# Patient Record
Sex: FEMALE | Race: WHITE | NOT HISPANIC OR LATINO | Employment: STUDENT | URBAN - METROPOLITAN AREA
[De-identification: names, ages, dates, MRNs, and addresses within clinical notes are randomized per-mention and may not be internally consistent; named-entity substitution may affect disease eponyms.]

---

## 2019-03-04 ENCOUNTER — APPOINTMENT (EMERGENCY)
Dept: RADIOLOGY | Facility: HOSPITAL | Age: 19
End: 2019-03-04
Payer: COMMERCIAL

## 2019-03-04 ENCOUNTER — HOSPITAL ENCOUNTER (EMERGENCY)
Facility: HOSPITAL | Age: 19
Discharge: HOME | End: 2019-03-04
Attending: EMERGENCY MEDICINE | Admitting: EMERGENCY MEDICINE
Payer: COMMERCIAL

## 2019-03-04 VITALS
SYSTOLIC BLOOD PRESSURE: 137 MMHG | DIASTOLIC BLOOD PRESSURE: 96 MMHG | RESPIRATION RATE: 24 BRPM | OXYGEN SATURATION: 100 % | TEMPERATURE: 98 F | WEIGHT: 138.67 LBS | BODY MASS INDEX: 22.29 KG/M2 | HEIGHT: 66 IN | HEART RATE: 101 BPM

## 2019-03-04 DIAGNOSIS — S82.409A FIBULA FRACTURE: Primary | ICD-10-CM

## 2019-03-04 PROCEDURE — 73610 X-RAY EXAM OF ANKLE: CPT

## 2019-03-04 PROCEDURE — 99283 EMERGENCY DEPT VISIT LOW MDM: CPT

## 2019-03-04 RX ORDER — FLUOXETINE HYDROCHLORIDE 20 MG/1
20 CAPSULE ORAL DAILY
COMMUNITY

## 2019-03-04 RX ORDER — HYDROCODONE BITARTRATE AND ACETAMINOPHEN 5; 325 MG/1; MG/1
1 TABLET ORAL EVERY 6 HOURS PRN
Qty: 12 TABLET | Refills: 0 | Status: SHIPPED | OUTPATIENT
Start: 2019-03-04

## 2019-03-04 RX ORDER — HYDROCODONE BITARTRATE AND ACETAMINOPHEN 5; 325 MG/1; MG/1
1 TABLET ORAL EVERY 6 HOURS PRN
Qty: 12 TABLET | Refills: 0
Start: 2019-03-04 | End: 2019-03-04 | Stop reason: SDUPTHER

## 2019-03-04 RX ORDER — IBUPROFEN 600 MG/1
600 TABLET ORAL ONCE
Status: COMPLETED | OUTPATIENT
Start: 2019-03-04 | End: 2019-03-04

## 2019-03-04 RX ADMIN — IBUPROFEN 600 MG: 600 TABLET ORAL at 19:55

## 2019-03-05 NOTE — ED PROVIDER NOTES
History  Chief Complaint   Patient presents with    Ankle Injury     ptfell on ice just prior to arrival       History provided by:  Patient  Ankle Injury   Location:  Right ankle  Severity:  Moderate  Onset quality:  Sudden  Timing:  Constant  Progression:  Waxing and waning  Chronicity:  New  Context:  Inversion injury  Relieved by:  Rest  Worsened by:  Ambulation and palpation  Associated symptoms: no fatigue, no fever and no rash        Prior to Admission Medications   Prescriptions Last Dose Informant Patient Reported? Taking? FLUoxetine (PROzac) 20 mg capsule   Yes Yes   Sig: Take 20 mg by mouth daily      Facility-Administered Medications: None       History reviewed  No pertinent past medical history  History reviewed  No pertinent surgical history  History reviewed  No pertinent family history  I have reviewed and agree with the history as documented  Social History     Tobacco Use    Smoking status: Never Smoker    Smokeless tobacco: Never Used   Substance Use Topics    Alcohol use: Never     Frequency: Never    Drug use: Never        Review of Systems   Constitutional: Positive for activity change  Negative for appetite change, chills, fatigue and fever  Musculoskeletal: Positive for arthralgias, gait problem and joint swelling  Skin: Negative for rash  Psychiatric/Behavioral: Negative for confusion  All other systems reviewed and are negative  Physical Exam  Physical Exam   Constitutional: She is oriented to person, place, and time  She appears well-developed and well-nourished  No distress  HENT:   Head: Normocephalic and atraumatic  Right Ear: External ear normal    Left Ear: External ear normal    Nose: Nose normal    Eyes: Conjunctivae are normal  Right eye exhibits no discharge  Left eye exhibits no discharge  Pulmonary/Chest: Effort normal    Musculoskeletal:   Examination of the right ankle-there is a marked amount of swelling present over the lateral aspect  There is tenderness palpated over the distal fibula and the lateral collateral ligaments  The foot is nontender the lower leg and knee are nontender with good range of motion  There are palpable pulses over the posterior tibial and dorsalis pedis arteries that are +2  Capillary refills less than 2 seconds   Neurological: She is alert and oriented to person, place, and time  Skin: Skin is warm  Capillary refill takes less than 2 seconds  She is not diaphoretic  Psychiatric: She has a normal mood and affect  Her behavior is normal  Judgment and thought content normal    Nursing note and vitals reviewed  Vital Signs  ED Triage Vitals   Temperature Pulse Respirations Blood Pressure SpO2   03/04/19 1943 03/04/19 1941 03/04/19 1941 03/04/19 1941 03/04/19 1941   98 °F (36 7 °C) 101 (!) 24 137/96 100 %      Temp Source Heart Rate Source Patient Position - Orthostatic VS BP Location FiO2 (%)   03/04/19 1943 03/04/19 1941 03/04/19 1941 03/04/19 1941 --   Oral Monitor Lying Right arm       Pain Score       03/04/19 1941       7           Vitals:    03/04/19 1941   BP: 137/96   Pulse: 101   Patient Position - Orthostatic VS: Lying       Visual Acuity      ED Medications  Medications   ibuprofen (MOTRIN) tablet 600 mg (600 mg Oral Given 3/4/19 1955)       Diagnostic Studies  Results Reviewed     None                 XR ankle 3+ views RIGHT   ED Interpretation by Polina Araiza PA-C (03/04 1953)   Nondisplaced distal fibular fracture      Final Result by Miles Ascencio MD (03/04 2046)      Acute nondisplaced oblique fracture of the distal right fibula  Findings concur with ED results as provided in PACS viewer/EPIC at the time of interpretation        Workstation performed: [id number]                    Procedures  Procedures       Phone Contacts  ED Phone Contact    ED Course                               MDM  Number of Diagnoses or Management Options  Fibula fracture: new and requires workup Amount and/or Complexity of Data Reviewed  Tests in the radiology section of CPT®: ordered and reviewed  Tests in the medicine section of CPT®: ordered and reviewed    Risk of Complications, Morbidity, and/or Mortality  Presenting problems: moderate  Diagnostic procedures: moderate  Management options: moderate  General comments: Patient presents emergency room after slipping on the ice and having an inversion injury to her right ankle  She was seen and examined  X-rays demonstrated a nondisplaced fracture of the distal fibula  Patient was placed in a posterior splint  She was given crutches to be nonweightbearing on the right lower extremity  She was given a prescription for Vicodin  She was given an orthopedic referral for follow-up  Patient Progress  Patient progress: stable      Disposition  Final diagnoses:   Fibula fracture - Acute nondisplaced distal fibular fracture     Time reflects when diagnosis was documented in both MDM as applicable and the Disposition within this note     Time User Action Codes Description Comment    3/4/2019  7:54 PM Gabriela Foley Add [F65 282I] Fibula fracture     3/4/2019  7:54 PM Gabriela Foley Modify [S82 409A] Fibula fracture Acute nondisplaced distal fibular fracture      ED Disposition     ED Disposition Condition Date/Time Comment    Discharge Stable Mon Mar 4, 2019  7:54 PM Zayda Joy discharge to home/self care              Follow-up Information     Follow up With Specialties Details Why [address], DO Orthopedic Surgery Schedule an appointment as soon as possible for a visit in 1 day  [address] [place] [address]  [address] [facility]            Discharge Medication List as of 3/4/2019  7:59 PM      CONTINUE these medications which have CHANGED    Details   HYDROcodone-acetaminophen (NORCO) 5-325 mg per tablet Take 1 tablet by mouth every 6 (six) hours as needed for pain for up to 12 dosesMax Daily Amount: 4 tablets, Starting Mon 3/4/2019, Print         CONTINUE these medications which have NOT CHANGED    Details   FLUoxetine (PROzac) 20 mg capsule Take 20 mg by mouth daily, Historical Med           Outpatient Discharge Orders   Splint     Crutches       ED Provider  Electronically Signed by           Maurice Gutierrez PA-C  03/04/19 9988

## 2019-03-08 ENCOUNTER — OFFICE VISIT (OUTPATIENT)
Dept: OBGYN CLINIC | Facility: MEDICAL CENTER | Age: 19
End: 2019-03-08
Payer: COMMERCIAL

## 2019-03-08 VITALS
HEIGHT: 66 IN | SYSTOLIC BLOOD PRESSURE: 114 MMHG | BODY MASS INDEX: 22.38 KG/M2 | DIASTOLIC BLOOD PRESSURE: 75 MMHG | HEART RATE: 70 BPM

## 2019-03-08 DIAGNOSIS — S82.831A NONDISPLACED FRACTURE OF DISTAL END OF RIGHT FIBULA: Primary | ICD-10-CM

## 2019-03-08 PROCEDURE — 29405 APPL SHORT LEG CAST: CPT | Performed by: ORTHOPAEDIC SURGERY

## 2019-03-08 PROCEDURE — 99204 OFFICE O/P NEW MOD 45 MIN: CPT | Performed by: ORTHOPAEDIC SURGERY

## 2019-03-08 NOTE — PROGRESS NOTES
Assessment:  1  Nondisplaced fracture of distal end of right fibula  Cast application     There is no problem list on file for this patient  Plan      Patient was  placed in a short leg cast nonweightbearing today   Patient is nonweightbearing and to ambulate with  crutches  Advised patient to take Aleve twice a day p r n  for pain  Advised patient to ice and elevate as much as possible   Will obtain  new x-rays of right ankle at follow up   Will have cast removed and have patient transition to Cam boot at follow up   Follow-up in 2 weeks            Subjective:     Patient ID:    Chief [address] [place] 19 y o  female      HPI    Patient comes in today with regards to right ankle   Patient states she slipped on black ice four days ago and her right leg went inward and felt a pop  Patient went to the ER and was placed in a splint and was provided crutches  Patient is non weight bearing  The patient reports that the pain is in the right lateral ankle  and has been going on for  4 days   The pain is rated at4 at its best and8 at its worst   The pain is described as constant trobbing achy pain   It is worsened with any pressure on the right foot, and is made better with rest and elevating  The patient has taken IBU  for treatment  Patient has history of right ankle sprain         The following portions of the patient's history were reviewed and updated as appropriate: allergies, current medications, past family history, past social history, past surgical history and problem list         Social History     Socioeconomic History    Marital status: Single     Spouse name: Not on file    Number of children: Not on file    Years of education: Not on file    Highest education level: Not on file   Occupational History    Not on file   Social Needs    Financial resource strain: Not on file    Food insecurity:     Worry: Not on file     Inability: Not on file    Transportation needs:     Medical: Not on file     Non-medical: Not on file   Tobacco Use    Smoking status: Never Smoker    Smokeless tobacco: Never Used   Substance and Sexual Activity    Alcohol use: Never     Frequency: Never    Drug use: Never    Sexual activity: Not on file   Lifestyle    Physical activity:     Days per week: Not on file     Minutes per session: Not on file    Stress: Not on file   Relationships    Social connections:     Talks on phone: Not on file     Gets together: Not on file     Attends [employer] service: Not on file     Active member of club or organization: Not on file     Attends meetings of clubs or organizations: Not on file     Relationship status: Not on file    Intimate partner violence:     Fear of current or ex partner: Not on file     Emotionally abused: Not on file     Physically abused: Not on file     Forced sexual activity: Not on file   Other Topics Concern    Not on file   Social History Narrative    Not on file     No past medical history on file  No past surgical history on file  No Known Allergies  Current Outpatient Medications on File Prior to Visit   Medication Sig Dispense Refill    FLUoxetine (PROzac) 20 mg capsule Take 20 mg by mouth daily      HYDROcodone-acetaminophen (NORCO) 5-325 mg per tablet Take 1 tablet by mouth every 6 (six) hours as needed for pain for up to 12 dosesMax Daily Amount: 4 tablets 12 tablet 0     No current facility-administered medications on file prior to visit  Objective:    Review of Systems   Constitutional: Negative for chills, fever and unexpected weight change  HENT: Negative for hearing loss, nosebleeds and postnasal drip  Eyes: Negative for pain, redness and visual disturbance  Respiratory: Negative for cough, shortness of breath and wheezing  Cardiovascular: Negative for chest pain, palpitations and leg swelling  Gastrointestinal: Negative for abdominal pain, nausea and vomiting  Endocrine: Negative for polydipsia and polyuria  Genitourinary: Negative for dysuria  Musculoskeletal: Positive for joint swelling  Skin: Negative for rash and wound  Neurological: Negative for dizziness, numbness and headaches  Psychiatric/Behavioral: Negative for decreased concentration and suicidal ideas  The patient is not nervous/anxious  Right Ankle Exam     Other   Erythema: absent  Scars: absent  Sensation: normal  Pulse: present     Comments:  Ecchymosis around the  fibula  Moderate swelling  Ecchymosis lateral  fibula and 5th metatarsal  Sensation intact  Able to get to neutral position  45° plantar  5° eversion  10° inversion                  Physical Exam   Constitutional: She is oriented to person, place, and time  She appears well-developed and well-nourished  Eyes: Pupils are equal, round, and reactive to light  Cardiovascular: Normal rate and regular rhythm  Pulmonary/Chest: Effort normal and breath sounds normal    Neurological: She is alert and oriented to person, place, and time  Skin: Skin is warm and dry  Psychiatric: She has a normal mood and affect  Her behavior is normal  Judgment and thought content normal        Cast application  Date/Time: 3/8/2019 2:52 PM  Performed by: Loan Barrera DO  Authorized by: Loan Barrera DO     Consent:     Consent obtained:  Verbal    Consent given by:  Patient  Procedure details:     Laterality:  Right    Location:  AnkleCast type:  Short leg    Supplies:  Cotton padding, fiberglass and skin protective strip           I have personally reviewed pertinent films in PACS and my interpretation is Xr Right ankle : nondisplaced fracture of the distal right fibular  Portions of the record may have been created with voice recognition software   Occasional wrong word or "sound a like" substitutions may have occurred due to the inherent limitations of voice recognition software   Read the chart carefully and recognize, using context, where substitutions have occurred

## 2019-03-20 ENCOUNTER — OFFICE VISIT (OUTPATIENT)
Dept: OBGYN CLINIC | Facility: CLINIC | Age: 19
End: 2019-03-20
Payer: COMMERCIAL

## 2019-03-20 ENCOUNTER — APPOINTMENT (OUTPATIENT)
Dept: RADIOLOGY | Facility: CLINIC | Age: 19
End: 2019-03-20
Payer: COMMERCIAL

## 2019-03-20 DIAGNOSIS — S82.831A NONDISPLACED FRACTURE OF DISTAL END OF RIGHT FIBULA: ICD-10-CM

## 2019-03-20 DIAGNOSIS — S82.831A NONDISPLACED FRACTURE OF DISTAL END OF RIGHT FIBULA: Primary | ICD-10-CM

## 2019-03-20 PROCEDURE — 99213 OFFICE O/P EST LOW 20 MIN: CPT | Performed by: ORTHOPAEDIC SURGERY

## 2019-03-20 PROCEDURE — 73610 X-RAY EXAM OF ANKLE: CPT

## 2019-03-20 NOTE — PROGRESS NOTES
Assessment:    status post nondisplaced oblique right distal fibular fracture on 03/04/2019    Plan:    patient transition from cast to a Cam walker boot today  Nonweightbearing in a Cam walker boot for 4 weeks, she may come out of it for hygiene  Oral analgesics as needed for pain   follow-up in 4 weeks, explained to the patient that at that time as long as x-rays appropriate will get her to the weight-bearing in the boot and after short period of weight-bearing with the boot should be able to get her out of that entirely and based on her degree of stiffness and range of motion may refer her to physical therapy        To Do Next Visit:   x-ray of the right ankle, please externally rotate the ankle  This time on the lateral to better visualize the fibula    CHIEF COMPLAINT:  Chief Complaint   Patient presents with    Right Ankle - Follow-up         SUBJECTIVE:  Maryetta Meigs is a 19y o  year old female who presents for follow up after  nondisplaced  Oblique fracture of the right distal fibula  Today patient has Pain  Mild  Intermittant  Dull and Aching  Around the right lateral ankle  She reports she has had no issues with the cast   She has been compliant with nonweightbearing status  No increases in pain  No numbness tingling fevers or chills        PHYSICAL EXAMINATION:  General: well developed and well nourished, alert, oriented times 3 and appears comfortable  Psychiatric: Normal    MUSCULOSKELETAL EXAMINATION:    Skin:  intact, there is mild swelling about the lateral ankle, there is no ecchymosis or erythema,  tenderness palpation over distal fibula but no palpable deformity  Range of Motion: As expected, Limited due to pain and Limited due to stiffness  Neurovascular status: Neuro intact, good cap refill        STUDIES REVIEWED:  Images were reviewd in PACS:  X-ray of right ankle demonstrates healing of distal oblique fibula fracture, there is no displacement in the interval since 2 weeks ago, there is some callus formation however fracture line still evident clearly on the lateral view      PROCEDURES PERFORMED:  Procedures  No Procedures performed today

## 2019-03-25 ENCOUNTER — TELEPHONE (OUTPATIENT)
Dept: OBGYN CLINIC | Facility: CLINIC | Age: 19
End: 2019-03-25

## 2019-03-25 NOTE — TELEPHONE ENCOUNTER
Dr Moris aGllardo  Patients mother called concerned about her daughters ankle  She is asking if you would please take on her care as she would like [facility] to follow up with a ankle specialist  Mom had conflicting answers from 2 doctors  Will you take on this patient?  If so daughter will call to schedule appt due to her class schedule

## 2019-04-02 ENCOUNTER — OFFICE VISIT (OUTPATIENT)
Dept: OBGYN CLINIC | Facility: CLINIC | Age: 19
End: 2019-04-02
Payer: COMMERCIAL

## 2019-04-02 ENCOUNTER — APPOINTMENT (OUTPATIENT)
Dept: RADIOLOGY | Facility: CLINIC | Age: 19
End: 2019-04-02
Payer: COMMERCIAL

## 2019-04-02 VITALS
SYSTOLIC BLOOD PRESSURE: 114 MMHG | BODY MASS INDEX: 22.38 KG/M2 | HEART RATE: 73 BPM | HEIGHT: 66 IN | DIASTOLIC BLOOD PRESSURE: 72 MMHG

## 2019-04-02 DIAGNOSIS — S82.831A NONDISPLACED FRACTURE OF DISTAL END OF RIGHT FIBULA: Primary | ICD-10-CM

## 2019-04-02 DIAGNOSIS — S82.831A NONDISPLACED FRACTURE OF DISTAL END OF RIGHT FIBULA: ICD-10-CM

## 2019-04-02 PROCEDURE — 99213 OFFICE O/P EST LOW 20 MIN: CPT | Performed by: ORTHOPAEDIC SURGERY

## 2019-04-02 PROCEDURE — 73610 X-RAY EXAM OF ANKLE: CPT

## 2019-04-02 RX ORDER — NORETHINDRONE, ETHINYL ESTRADIOL, AND FERROUS FUMARATE 0.8-25(24)
KIT ORAL
COMMUNITY
Start: 2019-01-17

## 2019-04-04 ENCOUNTER — EVALUATION (OUTPATIENT)
Dept: PHYSICAL THERAPY | Facility: CLINIC | Age: 19
End: 2019-04-04
Payer: COMMERCIAL

## 2019-04-04 DIAGNOSIS — S82.831A NONDISPLACED FRACTURE OF DISTAL END OF RIGHT FIBULA: Primary | ICD-10-CM

## 2019-04-04 PROCEDURE — G8979 MOBILITY GOAL STATUS: HCPCS | Performed by: PHYSICAL THERAPIST

## 2019-04-04 PROCEDURE — G8978 MOBILITY CURRENT STATUS: HCPCS | Performed by: PHYSICAL THERAPIST

## 2019-04-04 PROCEDURE — 97161 PT EVAL LOW COMPLEX 20 MIN: CPT | Performed by: PHYSICAL THERAPIST

## 2019-04-04 PROCEDURE — 97010 HOT OR COLD PACKS THERAPY: CPT | Performed by: PHYSICAL THERAPIST

## 2019-04-09 ENCOUNTER — OFFICE VISIT (OUTPATIENT)
Dept: PHYSICAL THERAPY | Facility: CLINIC | Age: 19
End: 2019-04-09
Payer: COMMERCIAL

## 2019-04-09 DIAGNOSIS — S82.831A NONDISPLACED FRACTURE OF DISTAL END OF RIGHT FIBULA: Primary | ICD-10-CM

## 2019-04-09 PROCEDURE — 97110 THERAPEUTIC EXERCISES: CPT | Performed by: PHYSICAL THERAPIST

## 2019-04-09 PROCEDURE — 97140 MANUAL THERAPY 1/> REGIONS: CPT | Performed by: PHYSICAL THERAPIST

## 2019-04-16 ENCOUNTER — OFFICE VISIT (OUTPATIENT)
Dept: PHYSICAL THERAPY | Facility: CLINIC | Age: 19
End: 2019-04-16
Payer: COMMERCIAL

## 2019-04-16 DIAGNOSIS — S82.831A NONDISPLACED FRACTURE OF DISTAL END OF RIGHT FIBULA: Primary | ICD-10-CM

## 2019-04-16 PROCEDURE — 97112 NEUROMUSCULAR REEDUCATION: CPT | Performed by: PHYSICAL THERAPIST

## 2019-04-16 PROCEDURE — 97140 MANUAL THERAPY 1/> REGIONS: CPT | Performed by: PHYSICAL THERAPIST

## 2019-04-16 PROCEDURE — 97110 THERAPEUTIC EXERCISES: CPT | Performed by: PHYSICAL THERAPIST

## 2019-04-19 ENCOUNTER — OFFICE VISIT (OUTPATIENT)
Dept: OBGYN CLINIC | Facility: CLINIC | Age: 19
End: 2019-04-19
Payer: COMMERCIAL

## 2019-04-19 ENCOUNTER — APPOINTMENT (OUTPATIENT)
Dept: RADIOLOGY | Facility: CLINIC | Age: 19
End: 2019-04-19
Payer: COMMERCIAL

## 2019-04-19 VITALS
BODY MASS INDEX: 22.38 KG/M2 | DIASTOLIC BLOOD PRESSURE: 69 MMHG | HEART RATE: 81 BPM | SYSTOLIC BLOOD PRESSURE: 104 MMHG | HEIGHT: 66 IN

## 2019-04-19 DIAGNOSIS — S82.831A NONDISPLACED FRACTURE OF DISTAL END OF RIGHT FIBULA: Primary | ICD-10-CM

## 2019-04-19 DIAGNOSIS — S82.831A NONDISPLACED FRACTURE OF DISTAL END OF RIGHT FIBULA: ICD-10-CM

## 2019-04-19 PROCEDURE — 99213 OFFICE O/P EST LOW 20 MIN: CPT | Performed by: ORTHOPAEDIC SURGERY

## 2019-04-19 PROCEDURE — 73610 X-RAY EXAM OF ANKLE: CPT

## 2019-04-26 ENCOUNTER — OFFICE VISIT (OUTPATIENT)
Dept: PHYSICAL THERAPY | Facility: CLINIC | Age: 19
End: 2019-04-26
Payer: COMMERCIAL

## 2019-04-26 DIAGNOSIS — S82.831A NONDISPLACED FRACTURE OF DISTAL END OF RIGHT FIBULA: Primary | ICD-10-CM

## 2019-04-26 PROCEDURE — 97112 NEUROMUSCULAR REEDUCATION: CPT | Performed by: PHYSICAL THERAPIST

## 2019-04-26 PROCEDURE — 97140 MANUAL THERAPY 1/> REGIONS: CPT | Performed by: PHYSICAL THERAPIST

## 2019-04-26 PROCEDURE — 97110 THERAPEUTIC EXERCISES: CPT | Performed by: PHYSICAL THERAPIST

## 2019-05-02 ENCOUNTER — EVALUATION (OUTPATIENT)
Dept: PHYSICAL THERAPY | Facility: CLINIC | Age: 19
End: 2019-05-02
Payer: COMMERCIAL

## 2019-05-02 DIAGNOSIS — S82.831A NONDISPLACED FRACTURE OF DISTAL END OF RIGHT FIBULA: Primary | ICD-10-CM

## 2019-05-02 PROCEDURE — 97112 NEUROMUSCULAR REEDUCATION: CPT | Performed by: PHYSICAL THERAPIST

## 2019-05-02 PROCEDURE — G8979 MOBILITY GOAL STATUS: HCPCS | Performed by: PHYSICAL THERAPIST

## 2019-05-02 PROCEDURE — 97116 GAIT TRAINING THERAPY: CPT | Performed by: PHYSICAL THERAPIST

## 2019-05-02 PROCEDURE — G8978 MOBILITY CURRENT STATUS: HCPCS | Performed by: PHYSICAL THERAPIST

## 2019-05-06 ENCOUNTER — OFFICE VISIT (OUTPATIENT)
Dept: PHYSICAL THERAPY | Facility: CLINIC | Age: 19
End: 2019-05-06
Payer: COMMERCIAL

## 2019-05-06 DIAGNOSIS — S82.831A NONDISPLACED FRACTURE OF DISTAL END OF RIGHT FIBULA: Primary | ICD-10-CM

## 2019-05-06 PROCEDURE — 97112 NEUROMUSCULAR REEDUCATION: CPT | Performed by: PHYSICAL THERAPIST

## 2019-05-06 PROCEDURE — 97116 GAIT TRAINING THERAPY: CPT | Performed by: PHYSICAL THERAPIST

## 2019-05-14 ENCOUNTER — OFFICE VISIT (OUTPATIENT)
Dept: PHYSICAL THERAPY | Facility: CLINIC | Age: 19
End: 2019-05-14
Payer: COMMERCIAL

## 2019-05-14 DIAGNOSIS — S82.831A NONDISPLACED FRACTURE OF DISTAL END OF RIGHT FIBULA: Primary | ICD-10-CM

## 2019-05-14 PROCEDURE — 97112 NEUROMUSCULAR REEDUCATION: CPT | Performed by: PHYSICAL THERAPIST

## 2019-05-14 PROCEDURE — 97140 MANUAL THERAPY 1/> REGIONS: CPT | Performed by: PHYSICAL THERAPIST

## 2019-05-14 PROCEDURE — 97116 GAIT TRAINING THERAPY: CPT | Performed by: PHYSICAL THERAPIST

## 2019-05-31 ENCOUNTER — OFFICE VISIT (OUTPATIENT)
Dept: OBGYN CLINIC | Facility: CLINIC | Age: 19
End: 2019-05-31
Payer: COMMERCIAL

## 2019-05-31 ENCOUNTER — OFFICE VISIT (OUTPATIENT)
Dept: PHYSICAL THERAPY | Facility: CLINIC | Age: 19
End: 2019-05-31
Payer: COMMERCIAL

## 2019-05-31 VITALS
HEIGHT: 66 IN | DIASTOLIC BLOOD PRESSURE: 68 MMHG | BODY MASS INDEX: 21.38 KG/M2 | WEIGHT: 133 LBS | SYSTOLIC BLOOD PRESSURE: 105 MMHG | HEART RATE: 66 BPM

## 2019-05-31 DIAGNOSIS — S82.831A NONDISPLACED FRACTURE OF DISTAL END OF RIGHT FIBULA: Primary | ICD-10-CM

## 2019-05-31 PROCEDURE — 97112 NEUROMUSCULAR REEDUCATION: CPT | Performed by: PHYSICAL MEDICINE & REHABILITATION

## 2019-05-31 PROCEDURE — 99213 OFFICE O/P EST LOW 20 MIN: CPT | Performed by: ORTHOPAEDIC SURGERY

## 2019-06-03 ENCOUNTER — APPOINTMENT (OUTPATIENT)
Dept: PHYSICAL THERAPY | Facility: CLINIC | Age: 19
End: 2019-06-03
Payer: COMMERCIAL

## 2019-06-07 ENCOUNTER — EVALUATION (OUTPATIENT)
Dept: PHYSICAL THERAPY | Facility: CLINIC | Age: 19
End: 2019-06-07
Payer: COMMERCIAL

## 2019-06-07 DIAGNOSIS — S82.831A NONDISPLACED FRACTURE OF DISTAL END OF RIGHT FIBULA: Primary | ICD-10-CM

## 2019-06-07 PROCEDURE — G8979 MOBILITY GOAL STATUS: HCPCS | Performed by: PHYSICAL THERAPIST

## 2019-06-07 PROCEDURE — G8980 MOBILITY D/C STATUS: HCPCS | Performed by: PHYSICAL THERAPIST

## 2019-06-07 PROCEDURE — 97112 NEUROMUSCULAR REEDUCATION: CPT | Performed by: PHYSICAL THERAPIST

## 2020-11-09 ENCOUNTER — NEW PATIENT (OUTPATIENT)
Dept: URBAN - METROPOLITAN AREA CLINIC 79 | Facility: CLINIC | Age: 20
End: 2020-11-09

## 2020-11-09 VITALS — HEIGHT: 55 IN

## 2020-11-09 DIAGNOSIS — R51.9: ICD-10-CM

## 2020-11-09 PROCEDURE — 99204 OFFICE O/P NEW MOD 45 MIN: CPT

## 2020-11-09 ASSESSMENT — VISUAL ACUITY
OD_SC: 20/20
OS_SC: J1+
OS_SC: 20/20
OD_SC: J1+

## 2020-11-09 ASSESSMENT — TONOMETRY
OS_IOP_MMHG: 11
OD_IOP_MMHG: 13

## 2021-12-15 ENCOUNTER — ESTABLISHED COMPREHENSIVE EXAM (OUTPATIENT)
Dept: URBAN - METROPOLITAN AREA CLINIC 79 | Facility: CLINIC | Age: 21
End: 2021-12-15

## 2021-12-15 DIAGNOSIS — R51.9: ICD-10-CM

## 2021-12-15 PROCEDURE — 99214 OFFICE O/P EST MOD 30 MIN: CPT

## 2021-12-15 ASSESSMENT — VISUAL ACUITY
OD_SC: J1+
OS_SC: J1+
OD_CC: 20/20
OS_CC: 20/20

## 2021-12-15 ASSESSMENT — TONOMETRY
OD_IOP_MMHG: 11
OS_IOP_MMHG: 11

## 2025-07-15 ENCOUNTER — TELEPHONE (OUTPATIENT)
Age: 25
End: 2025-07-15